# Patient Record
Sex: FEMALE | Race: WHITE | NOT HISPANIC OR LATINO | Employment: PART TIME | URBAN - METROPOLITAN AREA
[De-identification: names, ages, dates, MRNs, and addresses within clinical notes are randomized per-mention and may not be internally consistent; named-entity substitution may affect disease eponyms.]

---

## 2024-02-07 ENCOUNTER — HOSPITAL ENCOUNTER (OUTPATIENT)
Dept: RADIOLOGY | Facility: HOSPITAL | Age: 34
Discharge: HOME | End: 2024-02-07
Payer: COMMERCIAL

## 2024-02-07 DIAGNOSIS — M25.562 LEFT KNEE PAIN, UNSPECIFIED CHRONICITY: ICD-10-CM

## 2024-02-07 DIAGNOSIS — M25.561 RIGHT KNEE PAIN, UNSPECIFIED CHRONICITY: ICD-10-CM

## 2024-02-07 PROCEDURE — 73562 X-RAY EXAM OF KNEE 3: CPT

## 2024-02-12 ENCOUNTER — OFFICE VISIT (OUTPATIENT)
Dept: OBGYN CLINIC | Facility: CLINIC | Age: 34
End: 2024-02-12
Payer: COMMERCIAL

## 2024-02-12 VITALS — OXYGEN SATURATION: 98 % | BODY MASS INDEX: 22.74 KG/M2 | HEART RATE: 77 BPM | HEIGHT: 69 IN

## 2024-02-12 DIAGNOSIS — M22.2X2 PATELLOFEMORAL SYNDROME, BILATERAL: ICD-10-CM

## 2024-02-12 DIAGNOSIS — M22.2X1 PATELLOFEMORAL SYNDROME, BILATERAL: ICD-10-CM

## 2024-02-12 DIAGNOSIS — M71.20 SYNOVIAL CYST OF POPLITEAL SPACE, UNSPECIFIED LATERALITY: Primary | ICD-10-CM

## 2024-02-12 DIAGNOSIS — M25.561 PAIN IN RIGHT KNEE: ICD-10-CM

## 2024-02-12 PROBLEM — G43.109 MIGRAINE WITH AURA AND WITHOUT STATUS MIGRAINOSUS, NOT INTRACTABLE: Status: ACTIVE | Noted: 2022-09-16

## 2024-02-12 PROCEDURE — 99203 OFFICE O/P NEW LOW 30 MIN: CPT | Performed by: PHYSICIAN ASSISTANT

## 2024-02-12 RX ORDER — SERTRALINE HCL 100 MG
TABLET ORAL
COMMUNITY
Start: 2023-04-09

## 2024-02-12 NOTE — PROGRESS NOTES
Assessment/Plan   Diagnoses and all orders for this visit:    B/L knee pain  Bakers cyst  B/L patellofemoral syndrome    - Start PT  - Ice as needed  - Follow up with Dr. Bryan or Dr. Liu in 6 weeks          Subjective   Patient ID: Karely Shah is a 33 y.o. female.    Vitals:    02/12/24 1329   Pulse: 77   SpO2: 98%     32yo female comes in for an evaluation of her b/l knees.  She has been having pain for about 6 months (increased x 1 mo) with no specific injury.  She has pain and crepitus in the anterior knees that increases with walking uphill.  The left is slightly worse than the right.  A previous US showed baker cysts.  The pain is dull in character, mild in severity, does not radiate and is not associated with numbness.          The following portions of the patient's history were reviewed and updated as appropriate: allergies, current medications, past family history, past medical history, past social history, past surgical history, and problem list.    Review of Systems  Ortho Exam  Past Medical History:   Diagnosis Date    Post partum depression      History reviewed. No pertinent surgical history.  History reviewed. No pertinent family history.  Social History     Occupational History    Not on file   Tobacco Use    Smoking status: Never    Smokeless tobacco: Never   Vaping Use    Vaping status: Never Used   Substance and Sexual Activity    Alcohol use: Not on file    Drug use: Not on file    Sexual activity: Not on file       Review of Systems   Constitutional: Negative.    HENT: Negative.    Eyes: Negative.    Respiratory: Negative.    Cardiovascular: Negative.    Gastrointestinal: Negative.    Endocrine: Negative.    Genitourinary: Negative.    Musculoskeletal: As below..   Allergic/Immunologic: Negative.    Neurological: Negative.    Hematological: Negative.    Psychiatric/Behavioral: Negative.        Objective   Physical Exam    Constitutional: Awake, Alert, Oriented  Eyes: EOMI  Psych: Mood  and affect appropriate  Heart: regular rate   Lungs: No audible wheezing  Abdomen: No guarding  Lymph: no lymphedema  bilateral Knee:  Appearance  No swelling, discoloration, deformity, or ecchymosis  Effusion  none  Palpation  No tenderness about the medial / lateral joint line, patella, patellar tendon, MCL, LCL, hamstrings, or medial / lateral tibial plateau.  + patellar crepitus with ROM  ROM  Extension: 0 and Flexion: 130  Special Tests  Tiara's Test negative, Lachman's Test negative, Anterior Drawer Test negative, Posterior Drawer Test negative, Valgus Stress Test negative, Varus Stress Test negative, and Patellar apprehension negative  Motor  normal 5/5 in all planes  NVI distally    Xrays:  I have personally reviewed pertinent films in PACS and my interpretation is No acute displaced fracture.

## 2024-02-21 ENCOUNTER — EVALUATION (OUTPATIENT)
Dept: PHYSICAL THERAPY | Facility: CLINIC | Age: 34
End: 2024-02-21
Payer: COMMERCIAL

## 2024-02-21 DIAGNOSIS — M22.2X2 PATELLOFEMORAL SYNDROME, BILATERAL: ICD-10-CM

## 2024-02-21 DIAGNOSIS — G89.29 CHRONIC PAIN OF RIGHT KNEE: Primary | ICD-10-CM

## 2024-02-21 DIAGNOSIS — M22.2X1 PATELLOFEMORAL SYNDROME, BILATERAL: ICD-10-CM

## 2024-02-21 DIAGNOSIS — M25.561 PAIN IN RIGHT KNEE: ICD-10-CM

## 2024-02-21 DIAGNOSIS — M71.20 SYNOVIAL CYST OF POPLITEAL SPACE, UNSPECIFIED LATERALITY: ICD-10-CM

## 2024-02-21 DIAGNOSIS — M25.562 CHRONIC PAIN OF LEFT KNEE: ICD-10-CM

## 2024-02-21 DIAGNOSIS — M25.561 CHRONIC PAIN OF RIGHT KNEE: Primary | ICD-10-CM

## 2024-02-21 DIAGNOSIS — G89.29 CHRONIC PAIN OF LEFT KNEE: ICD-10-CM

## 2024-02-21 PROCEDURE — 97161 PT EVAL LOW COMPLEX 20 MIN: CPT | Performed by: PHYSICAL THERAPIST

## 2024-02-21 NOTE — PROGRESS NOTES
PT Evaluation    Today's date: 2024   Patient name: Karely Shah  : 1990  MRN: [id number]  Referring provider: Moses Vegas PA-C  Dx:   Encounter Diagnosis     ICD-10-CM    1. Chronic pain of right knee  M25.561     G89.29       2. Chronic pain of left knee  M25.562     G89.29               Subjective Evaluation     History of Present Illness    Patient presents with c/o knee pain 6  months ago when she was 3 months post partum and had B posterior knee pain to mid calf pain. It became hard to walk and her knees would crack a lot and give out. She thought she had blood clots but was ruled out and found to have bakers cyst. She feels pressure behind kneecap. She states both her knees have given out and it depends which one at the time.   She did have sciatica and LLE pain to her calf.   She states she had post partum       Neuro signs: none   Bowel and bladder sxs: none  Red flags: none  Occupation: part time- nurse- active neuro [employer]       Pain  At best pain ratin  At worst pain ratin  Location: B underneath knee caps medially/laterally  Quality: achy  Relieving factors: rest, brace, ice, heat  Aggravating factors: activity, bending, walking,            Patient Goals  Patient goals for therapy: To have better body mechanics.     STGs  1. Decrease pain by 20% in 2-4 weeks to improve standing tolerance.  2. Improve ROM to min limitation in 2-4 weeks to functional mobility in transfers.   3. Improve hip strength by 1/3 grade in 2-4 weeks.  4. Improve to walking s leg giving out for 4 weeks.     LTGs  1. Decrease pain by 60% in 6-8 weeks.  2. Improve walking tolerance to >20 minutes in 6-8 weeks to perform community ambulation.   3. Perform job/dressing/showering activities independently without pain in 6-8 weeks.    4. Improve body mechanics to squatting 10# s pain for work activities in 8 weeks.   5. Report no episodes of legs giving out in 8 weeks. .       Objective  Measurements:    Observation:rearfoot neutral B  Balance: SLS EC WFL  Gait: stiffness felt when in L midstance.   She felt more loose and better walking after mobilizations and achieved knee extension in midstance  Squat: 5 reps good depth clicking when coming up L>R and then felt as if knee was giving out  Reflexes:   Sensation:WFL         Slump:- SLR:-  Margarito: - Faddir: - Hip distraction:nt   Ely’s: WFL  CHRIS:R medial patellar glide hypermobile  L lateral patellar glide hypomobile and improved pain c quad set and c knee extension.   She was pain free c doing quad set after mobilization  Palpation:L inferior patellar tendon ttp    Lumbar ROM L R Strength ankle L R   Flex  WFL  DF     Ext Min baltazar 5 reps felt good   PF     Rot   eversion     EIL 2x10 felt good but no change of strength  inversion     SB        Hip A/PROM WFL WFL HIP     Flex  /  / Flex 5 5   ER at 90   ER     IR at 90   IR     Abd   Abd     Ext   Ext 4- 4           Knee A/PROM   Knee     Flex WFl WFL Flex 5 5   Ext 5p/5p 0/0 Ext 5 5         Assessment:    Karely Shah is a pleasant 33 y.o. female who presents with primary movement impairment diagnosis of L>R lateral patellar glide hypomobility resulting in pathoanatomical symptoms of patellofemoral pain syndrome. She also has deficits in hip extension strength, decreased patellar mobility and knee extension motion and pain with squatting.  This is limiting  her ability to work, perform ADLs, take care of children and perform all bending at home.   The patient's greatest concern is improving her mechanics of movement.  No further referral appears necessary at this time based upon examination results.        Etiologic factors include quad set.    Negative prognostic indicators:none. Positive prognostic indicators: good relief of knee extension ROM c mobilizations. Please contact me if you have any further questions or recommendations. Thank you very much for the kind referral.        Plan  Patient  would benefit from:Skilled physical therapy  Planned therapy interventions: manual therapy, neuromuscular re-education, stretching, strengthening, therapeutic activities, therapeutic exercise, patient education, home exercise program, and activity modification.    Frequency: 1-2x week  Duration in weeks: 6-8  Treatment plan discussed with: patient             Goals: Patient's goal is to improve her function and mechanics of lifting    Precautions: post partum 10 months  Dx: L> R patellar lateral glide hypomobility      Daily Treatment Diary     Manuals 2/21/2024        Medial L patellar mob gr 3-4  8 instructed patient as well                                   Ther Ex         Hip ext 10x        bridges         Lumbar ext 10x        treadmill                                                      Neuro Re-ed         Quad set 10x :05                                            Ther Activity                                    Gait Training                           Modalities